# Patient Record
Sex: FEMALE | Race: BLACK OR AFRICAN AMERICAN | URBAN - METROPOLITAN AREA
[De-identification: names, ages, dates, MRNs, and addresses within clinical notes are randomized per-mention and may not be internally consistent; named-entity substitution may affect disease eponyms.]

---

## 2021-10-20 ENCOUNTER — OFFICE VISIT (OUTPATIENT)
Dept: URBAN - METROPOLITAN AREA CLINIC 92 | Facility: CLINIC | Age: 66
End: 2021-10-20

## 2021-11-12 ENCOUNTER — OFFICE VISIT (OUTPATIENT)
Dept: URBAN - METROPOLITAN AREA CLINIC 92 | Facility: CLINIC | Age: 66
End: 2021-11-12

## 2021-12-07 ENCOUNTER — OFFICE VISIT (OUTPATIENT)
Dept: URBAN - METROPOLITAN AREA CLINIC 92 | Facility: CLINIC | Age: 66
End: 2021-12-07

## 2022-01-07 ENCOUNTER — OFFICE VISIT (OUTPATIENT)
Dept: URBAN - METROPOLITAN AREA CLINIC 92 | Facility: CLINIC | Age: 67
End: 2022-01-07

## 2022-01-13 ENCOUNTER — OFFICE VISIT (OUTPATIENT)
Dept: URBAN - METROPOLITAN AREA CLINIC 92 | Facility: CLINIC | Age: 67
End: 2022-01-13

## 2022-01-25 ENCOUNTER — OFFICE VISIT (OUTPATIENT)
Dept: URBAN - METROPOLITAN AREA CLINIC 92 | Facility: CLINIC | Age: 67
End: 2022-01-25
Payer: MEDICARE

## 2022-01-25 ENCOUNTER — WEB ENCOUNTER (OUTPATIENT)
Dept: URBAN - METROPOLITAN AREA CLINIC 92 | Facility: CLINIC | Age: 67
End: 2022-01-25

## 2022-01-25 DIAGNOSIS — N18.6 END STAGE RENAL DISEASE: ICD-10-CM

## 2022-01-25 DIAGNOSIS — Z86.010 HISTORY OF COLON POLYPS: ICD-10-CM

## 2022-01-25 DIAGNOSIS — Z99.2 DEPENDENCE ON RENAL DIALYSIS: ICD-10-CM

## 2022-01-25 PROBLEM — 105502003: Status: ACTIVE | Noted: 2022-01-25

## 2022-01-25 PROBLEM — 236435004: Status: ACTIVE | Noted: 2022-01-25

## 2022-01-25 PROCEDURE — 99202 OFFICE O/P NEW SF 15 MIN: CPT | Performed by: INTERNAL MEDICINE

## 2022-01-25 NOTE — HPI-TODAY'S VISIT:
65 yo female for colonoscopy. last colonoscopy 5 years ago. no GI symptoms.  she does not have complete list of meds

## 2023-02-14 ENCOUNTER — LAB OUTSIDE AN ENCOUNTER (OUTPATIENT)
Dept: URBAN - METROPOLITAN AREA CLINIC 92 | Facility: CLINIC | Age: 68
End: 2023-02-14

## 2023-02-14 ENCOUNTER — OFFICE VISIT (OUTPATIENT)
Dept: URBAN - METROPOLITAN AREA CLINIC 92 | Facility: CLINIC | Age: 68
End: 2023-02-14
Payer: MEDICARE

## 2023-02-14 ENCOUNTER — DASHBOARD ENCOUNTERS (OUTPATIENT)
Age: 68
End: 2023-02-14

## 2023-02-14 VITALS
TEMPERATURE: 97.2 F | DIASTOLIC BLOOD PRESSURE: 124 MMHG | WEIGHT: 136 LBS | HEIGHT: 66 IN | BODY MASS INDEX: 21.86 KG/M2 | HEART RATE: 82 BPM | SYSTOLIC BLOOD PRESSURE: 205 MMHG

## 2023-02-14 DIAGNOSIS — Z86.010 PERSONAL HISTORY OF COLONIC POLYPS: ICD-10-CM

## 2023-02-14 PROCEDURE — 99213 OFFICE O/P EST LOW 20 MIN: CPT | Performed by: INTERNAL MEDICINE

## 2023-02-14 RX ORDER — POLYETHYLENE GLYCOL 3350, SODIUM SULFATE ANHYDROUS, SODIUM BICARBONATE, SODIUM CHLORIDE, POTASSIUM CHLORIDE 236; 22.74; 6.74; 5.86; 2.97 G/4L; G/4L; G/4L; G/4L; G/4L
AS DIRECTED POWDER, FOR SOLUTION ORAL ONCE
Qty: 1 UNSPECIFIED | Refills: 0 | OUTPATIENT
Start: 2023-02-14 | End: 2023-02-15

## 2023-02-14 NOTE — HPI-SCREENING
67yF with a hx of HTN, ESRD on dialysis, colon polyps who presents to discuss CRC surveillance. Believes last colon about 5y ago. Does not know her fam hx. Denies blood in stool.

## 2023-02-14 NOTE — EXAM-PHYSICAL EXAM
CONSTITUTIONAL - well-developed, well-nourished, NAD HEENT - sclerae and conjuntivae appear normal, external nose normal appearance, no nasal discharge NECK - normal appearance, no deformities CHEST - no increased work of breathing, no accessory muscle use CV - no edema, regular rate, +KERVIN GI - soft, NTND, no guarding or rigidity, no palpable masses or HSM MSK - no deformities, normal gait SKIN - good turgor, no obvious rashes NEURO - AAOx3 PSYCH - normal mood and appropriate affect

## 2023-02-27 PROBLEM — 428283002 HISTORY OF POLYP OF COLON (SITUATION): Status: ACTIVE | Noted: 2023-02-14

## 2023-04-20 ENCOUNTER — OFFICE VISIT (OUTPATIENT)
Dept: URBAN - METROPOLITAN AREA MEDICAL CENTER 12 | Facility: MEDICAL CENTER | Age: 68
End: 2023-04-20

## 2023-05-26 ENCOUNTER — TELEPHONE ENCOUNTER (OUTPATIENT)
Dept: URBAN - METROPOLITAN AREA CLINIC 92 | Facility: CLINIC | Age: 68
End: 2023-05-26

## 2023-05-26 RX ORDER — POLYETHYLENE GLYCOL 3350, SODIUM SULFATE ANHYDROUS, SODIUM BICARBONATE, SODIUM CHLORIDE, POTASSIUM CHLORIDE 236; 22.74; 6.74; 5.86; 2.97 G/4L; G/4L; G/4L; G/4L; G/4L
AS DIRECTED POWDER, FOR SOLUTION ORAL ONCE
Qty: 1 UNSPECIFIED | Refills: 0
Start: 2023-02-14 | End: 2023-05-31

## 2023-07-18 ENCOUNTER — TELEPHONE ENCOUNTER (OUTPATIENT)
Dept: URBAN - METROPOLITAN AREA CLINIC 92 | Facility: CLINIC | Age: 68
End: 2023-07-18

## 2023-07-18 ENCOUNTER — OFFICE VISIT (OUTPATIENT)
Dept: URBAN - METROPOLITAN AREA MEDICAL CENTER 12 | Facility: MEDICAL CENTER | Age: 68
End: 2023-07-18

## 2025-02-28 NOTE — PHYSICAL EXAM CHEST:
As a reminder, please remember to come 15 minutes early to your next appointment. We use this time to discuss concerns regarding your appointment, obtain vital signs, and completing any screenings or labs that are due. This helps optimize your appointment allowing for adequate time with Dr. Ching.    For scheduled follow-up appointments, we may order testing to be done prior to your visit with the intent that we will address it at the appointment. We will only contact you prior to the appointment if there is an urgent result which cannot wait until the appointment.    Please call in if you had lab work or imaging done at your appointment today and do not hear from our office within 72 hours. Please be aware that if you have an active [employer] account, you will likely be able to see your laboratory test results before Dr. Ching has reviewed and advised upon them. Please allow our office time to review your results and make recommendations. If you are concerned about your test results, or are concerned about the length of time you are waiting to hear back from the clinic, please feel free to call and leave a message. We are always happy to hear from you.    Our main clinic number is [phone].    You may be receiving a survey by text or email and if you do, we would appreciate your honest   feedback. This information comes back to the clinic so it can be used to improve patient experience and   quality. Your opinion is important to us. Thank you    no lesions,  no deformities,  no traumatic injuries,  no significant scars are present,  chest wall non-tender,  no masses present,  tactile fremitus is normal, breathing is unlabored without accessory muscle use,normal breath sounds